# Patient Record
Sex: MALE | Race: WHITE | URBAN - METROPOLITAN AREA
[De-identification: names, ages, dates, MRNs, and addresses within clinical notes are randomized per-mention and may not be internally consistent; named-entity substitution may affect disease eponyms.]

---

## 2022-11-02 ENCOUNTER — OFFICE VISIT (OUTPATIENT)
Dept: URBAN - METROPOLITAN AREA CLINIC 28 | Facility: CLINIC | Age: 47
End: 2022-11-02
Payer: COMMERCIAL

## 2022-11-02 DIAGNOSIS — Z83.518 FAMILY HISTORY OF OTHER SPECIFIED EYE DISORDER: Primary | ICD-10-CM

## 2022-11-02 DIAGNOSIS — H52.223 REGULAR ASTIGMATISM, BILATERAL: ICD-10-CM

## 2022-11-02 DIAGNOSIS — Z01.00 ENCOUNTER FOR EXAM OF EYE W/O ABNORMAL FINDING: ICD-10-CM

## 2022-11-02 PROCEDURE — 92004 COMPRE OPH EXAM NEW PT 1/>: CPT | Performed by: OPTOMETRIST

## 2022-11-02 ASSESSMENT — INTRAOCULAR PRESSURE
OD: 17
OS: 16

## 2022-11-02 ASSESSMENT — KERATOMETRY
OD: 45.25
OS: 45.38

## 2022-11-02 ASSESSMENT — VISUAL ACUITY
OS: 20/20
OD: 20/20

## 2022-11-02 NOTE — IMPRESSION/PLAN
Impression: Regular astigmatism, bilateral: H52.223. Plan: Updated glasses Rx given for full time wear and DVO for sunglasses. Monitor.

## 2022-11-02 NOTE — IMPRESSION/PLAN
Impression: Family history of other specified eye disorder: Z80.1. Plan: Educated on exam findings. Pt reports h/o vision issues with his father (who is ). Pt unsure of exact issue (cataracts vs glaucoma). Educated on normal ocular health findings. Monitor.